# Patient Record
Sex: MALE | Race: BLACK OR AFRICAN AMERICAN | NOT HISPANIC OR LATINO | URBAN - METROPOLITAN AREA
[De-identification: names, ages, dates, MRNs, and addresses within clinical notes are randomized per-mention and may not be internally consistent; named-entity substitution may affect disease eponyms.]

---

## 2017-01-23 ENCOUNTER — OUTPATIENT (OUTPATIENT)
Dept: OUTPATIENT SERVICES | Facility: HOSPITAL | Age: 52
LOS: 1 days | End: 2017-01-23
Payer: COMMERCIAL

## 2017-01-23 VITALS
TEMPERATURE: 97 F | SYSTOLIC BLOOD PRESSURE: 131 MMHG | RESPIRATION RATE: 18 BRPM | HEART RATE: 58 BPM | HEIGHT: 74 IN | DIASTOLIC BLOOD PRESSURE: 83 MMHG | OXYGEN SATURATION: 99 % | WEIGHT: 210.1 LBS

## 2017-01-23 VITALS
HEART RATE: 61 BPM | DIASTOLIC BLOOD PRESSURE: 83 MMHG | SYSTOLIC BLOOD PRESSURE: 131 MMHG | OXYGEN SATURATION: 99 % | WEIGHT: 210.1 LBS | HEIGHT: 74 IN | TEMPERATURE: 97 F | RESPIRATION RATE: 18 BRPM

## 2017-01-23 DIAGNOSIS — Z01.810 ENCOUNTER FOR PREPROCEDURAL CARDIOVASCULAR EXAMINATION: ICD-10-CM

## 2017-01-23 LAB
ANION GAP SERPL CALC-SCNC: 10 MMOL/L — SIGNIFICANT CHANGE UP (ref 5–17)
APTT BLD: 35.4 SEC — SIGNIFICANT CHANGE UP (ref 27.5–37.4)
BASOPHILS # BLD AUTO: 0 K/UL — SIGNIFICANT CHANGE UP (ref 0–0.2)
BASOPHILS NFR BLD AUTO: 0.2 % — SIGNIFICANT CHANGE UP (ref 0–2)
BUN SERPL-MCNC: 19 MG/DL — SIGNIFICANT CHANGE UP (ref 8–20)
CALCIUM SERPL-MCNC: 9 MG/DL — SIGNIFICANT CHANGE UP (ref 8.6–10.2)
CHLORIDE SERPL-SCNC: 103 MMOL/L — SIGNIFICANT CHANGE UP (ref 98–107)
CHOLEST SERPL-MCNC: 139 MG/DL — SIGNIFICANT CHANGE UP (ref 110–199)
CO2 SERPL-SCNC: 27 MMOL/L — SIGNIFICANT CHANGE UP (ref 22–29)
CREAT SERPL-MCNC: 1.26 MG/DL — SIGNIFICANT CHANGE UP (ref 0.5–1.3)
EOSINOPHIL # BLD AUTO: 0 K/UL — SIGNIFICANT CHANGE UP (ref 0–0.5)
EOSINOPHIL NFR BLD AUTO: 0.9 % — SIGNIFICANT CHANGE UP (ref 0–5)
GLUCOSE SERPL-MCNC: 98 MG/DL — SIGNIFICANT CHANGE UP (ref 70–115)
HCT VFR BLD CALC: 41.2 % — LOW (ref 42–52)
HDLC SERPL-MCNC: 71 MG/DL — SIGNIFICANT CHANGE UP
HGB BLD-MCNC: 14 G/DL — SIGNIFICANT CHANGE UP (ref 14–18)
INR BLD: 1.23 RATIO — HIGH (ref 0.88–1.16)
LIPID PNL WITH DIRECT LDL SERPL: 54 MG/DL — SIGNIFICANT CHANGE UP
LYMPHOCYTES # BLD AUTO: 1.2 K/UL — SIGNIFICANT CHANGE UP (ref 1–4.8)
LYMPHOCYTES # BLD AUTO: 26.2 % — SIGNIFICANT CHANGE UP (ref 20–55)
MCHC RBC-ENTMCNC: 34 G/DL — SIGNIFICANT CHANGE UP (ref 32–36)
MCHC RBC-ENTMCNC: 34.1 PG — HIGH (ref 27–31)
MCV RBC AUTO: 100.5 FL — HIGH (ref 80–94)
MONOCYTES # BLD AUTO: 0.4 K/UL — SIGNIFICANT CHANGE UP (ref 0–0.8)
MONOCYTES NFR BLD AUTO: 9.3 % — SIGNIFICANT CHANGE UP (ref 3–10)
NEUTROPHILS # BLD AUTO: 2.9 K/UL — SIGNIFICANT CHANGE UP (ref 1.8–8)
NEUTROPHILS NFR BLD AUTO: 63.2 % — SIGNIFICANT CHANGE UP (ref 37–73)
PLATELET # BLD AUTO: 267 K/UL — SIGNIFICANT CHANGE UP (ref 150–400)
POTASSIUM SERPL-MCNC: 4.1 MMOL/L — SIGNIFICANT CHANGE UP (ref 3.5–5.3)
POTASSIUM SERPL-SCNC: 4.1 MMOL/L — SIGNIFICANT CHANGE UP (ref 3.5–5.3)
PROTHROM AB SERPL-ACNC: 13.6 SEC — HIGH (ref 10–13.1)
RBC # BLD: 4.1 M/UL — LOW (ref 4.6–6.2)
RBC # FLD: 12.7 % — SIGNIFICANT CHANGE UP (ref 11–15.6)
SODIUM SERPL-SCNC: 140 MMOL/L — SIGNIFICANT CHANGE UP (ref 135–145)
TOTAL CHOLESTEROL/HDL RATIO MEASUREMENT: 2 RATIO — LOW (ref 3.4–9.6)
TRIGL SERPL-MCNC: 71 MG/DL — SIGNIFICANT CHANGE UP (ref 10–200)
WBC # BLD: 4.61 K/UL — LOW (ref 4.8–10.8)
WBC # FLD AUTO: 4.61 K/UL — LOW (ref 4.8–10.8)

## 2017-01-23 PROCEDURE — 80048 BASIC METABOLIC PNL TOTAL CA: CPT

## 2017-01-23 PROCEDURE — G0463: CPT

## 2017-01-23 PROCEDURE — 93005 ELECTROCARDIOGRAM TRACING: CPT

## 2017-01-23 PROCEDURE — 85610 PROTHROMBIN TIME: CPT

## 2017-01-23 PROCEDURE — 85027 COMPLETE CBC AUTOMATED: CPT

## 2017-01-23 PROCEDURE — 85730 THROMBOPLASTIN TIME PARTIAL: CPT

## 2017-01-23 PROCEDURE — 93010 ELECTROCARDIOGRAM REPORT: CPT

## 2017-01-23 PROCEDURE — 80061 LIPID PANEL: CPT

## 2017-01-23 NOTE — ASU PATIENT PROFILE, ADULT - PMH
Cardiomegaly    HTN (hypertension) Cardiomegaly    ETOH abuse  reports  he  is  not  an  alcoholic,  does  dring  six  pk  beer  daily  HTN (hypertension)    Smoker  .5  ppd,  cigarette,  THC   3  x   weekly

## 2017-01-23 NOTE — H&P CARDIOLOGY - FAMILY HISTORY
Mother  Still living? Yes, Estimated age: 61-70  Family history of cardiomyopathy, Age at diagnosis: Age Unknown     Child  Still living? Yes, Estimated age: 11-20  Family history of pacemaker, Age at diagnosis: Age Unknown

## 2017-01-23 NOTE — H&P CARDIOLOGY - HISTORY OF PRESENT ILLNESS
52 yo male with HTN no longer on medications, smoker, heavy drinker and occasional maijuana use, and cardiomegaly with a family history of cardiomyopathy. He had shoulder surgery and prior to surgery had a cardiac work-up for abnormal EKG. NST demonstarted small area basal to mid inferoposterior wall with moderately reduced EF of 38%. Echo suggests LVEF 45-50%, mild LVH and mildly dilated aortic root. He now presents for PST prior to [facility].    Cardiologist: Dr. Cornejo  PMD: Dr. Latham

## 2017-01-24 ENCOUNTER — TRANSCRIPTION ENCOUNTER (OUTPATIENT)
Age: 52
End: 2017-01-24

## 2017-01-24 ENCOUNTER — OUTPATIENT (OUTPATIENT)
Dept: OUTPATIENT SERVICES | Facility: HOSPITAL | Age: 52
LOS: 1 days | Discharge: HOME | End: 2017-01-24
Payer: COMMERCIAL

## 2017-01-24 VITALS
TEMPERATURE: 98 F | SYSTOLIC BLOOD PRESSURE: 126 MMHG | OXYGEN SATURATION: 98 % | HEART RATE: 60 BPM | DIASTOLIC BLOOD PRESSURE: 67 MMHG | RESPIRATION RATE: 16 BRPM

## 2017-01-24 VITALS
DIASTOLIC BLOOD PRESSURE: 90 MMHG | RESPIRATION RATE: 17 BRPM | OXYGEN SATURATION: 98 % | SYSTOLIC BLOOD PRESSURE: 140 MMHG | HEART RATE: 59 BPM

## 2017-01-24 DIAGNOSIS — I25.10 ATHEROSCLEROTIC HEART DISEASE OF NATIVE CORONARY ARTERY WITHOUT ANGINA PECTORIS: ICD-10-CM

## 2017-01-24 DIAGNOSIS — Z01.810 ENCOUNTER FOR PREPROCEDURAL CARDIOVASCULAR EXAMINATION: ICD-10-CM

## 2017-01-24 DIAGNOSIS — I51.7 CARDIOMEGALY: ICD-10-CM

## 2017-01-24 PROCEDURE — 93460 R&L HRT ART/VENTRICLE ANGIO: CPT | Mod: 26

## 2017-01-24 RX ORDER — LISINOPRIL 2.5 MG/1
1 TABLET ORAL
Qty: 0 | Refills: 0 | COMMUNITY

## 2017-01-24 RX ORDER — CARVEDILOL PHOSPHATE 80 MG/1
1 CAPSULE, EXTENDED RELEASE ORAL
Qty: 0 | Refills: 0 | COMMUNITY

## 2017-01-24 RX ORDER — SODIUM CHLORIDE 9 MG/ML
1000 INJECTION INTRAMUSCULAR; INTRAVENOUS; SUBCUTANEOUS
Qty: 0 | Refills: 0 | Status: DISCONTINUED | OUTPATIENT
Start: 2017-01-24 | End: 2017-02-08

## 2017-01-24 RX ORDER — ASPIRIN/CALCIUM CARB/MAGNESIUM 324 MG
325 TABLET ORAL ONCE
Qty: 0 | Refills: 0 | Status: COMPLETED | OUTPATIENT
Start: 2017-01-24 | End: 2017-01-24

## 2017-01-24 RX ADMIN — Medication 325 MILLIGRAM(S): at 10:36

## 2017-01-24 NOTE — DISCHARGE NOTE ADULT - MEDICATION SUMMARY - MEDICATIONS TO TAKE
I will START or STAY ON the medications listed below when I get home from the hospital:    lisinopril 10 mg oral tablet  -- 1 tab(s) by mouth once a day  -- Indication: For bp    carvedilol 3.125 mg oral tablet  -- 1 tab(s) by mouth 2 times a day  -- Indication: For bp

## 2017-01-24 NOTE — DISCHARGE NOTE ADULT - CARE PROVIDER_API CALL
Keron Olivares ([facility]), Cardiology; Cardiovascular Disease; Interventional Cardiology  [address] [place] [id number]  Phone: [phone]  Fax: [phone]

## 2017-01-24 NOTE — DISCHARGE NOTE ADULT - CARE PLAN
Principal Discharge DX:	Cardiomegaly  Goal:	optimize cardiac health  Instructions for follow-up, activity and diet:	Restricted use with no heavy lifting of affected arm for 48 hours.  No submerging the arm in water for 48 hours.  You may start showering today.  Call your doctor for any bleeding, swelling, loss of sensation in the hand or fingers, or fingers turning blue.  If heavy bleeding or large lumps form, hold pressure at the spot and come to the Emergency Room.

## 2017-01-24 NOTE — DISCHARGE NOTE ADULT - PLAN OF CARE
optimize cardiac health Restricted use with no heavy lifting of affected arm for 48 hours.  No submerging the arm in water for 48 hours.  You may start showering today.  Call your doctor for any bleeding, swelling, loss of sensation in the hand or fingers, or fingers turning blue.  If heavy bleeding or large lumps form, hold pressure at the spot and come to the Emergency Room.

## 2017-01-24 NOTE — DISCHARGE NOTE ADULT - CARE PROVIDERS DIRECT ADDRESSES
,DirectAddress_Unknown,[email or web address]

## 2017-01-24 NOTE — DISCHARGE NOTE ADULT - PATIENT PORTAL LINK FT
“You can access the [facility] Patient Portal, offered by [facility], by registering with the following website: [email or web address]”

## 2017-04-13 PROCEDURE — C1769: CPT

## 2017-04-13 PROCEDURE — C1760: CPT

## 2017-04-13 PROCEDURE — C1894: CPT

## 2017-04-13 PROCEDURE — 93460 R&L HRT ART/VENTRICLE ANGIO: CPT

## 2017-04-13 PROCEDURE — C1889: CPT

## 2017-04-13 PROCEDURE — C1887: CPT

## 2018-01-26 ENCOUNTER — RX RENEWAL (OUTPATIENT)
Age: 53
End: 2018-01-26

## 2018-06-04 ENCOUNTER — RX RENEWAL (OUTPATIENT)
Age: 53
End: 2018-06-04

## 2018-07-30 ENCOUNTER — RX RENEWAL (OUTPATIENT)
Age: 53
End: 2018-07-30

## 2018-07-30 RX ORDER — LISINOPRIL 10 MG/1
10 TABLET ORAL
Qty: 90 | Refills: 1 | Status: ACTIVE | COMMUNITY
Start: 2018-01-26 | End: 1900-01-01

## 2018-08-29 PROBLEM — I51.7 CARDIOMEGALY: Chronic | Status: ACTIVE | Noted: 2017-01-23

## 2018-08-29 PROBLEM — F10.10 ALCOHOL ABUSE, UNCOMPLICATED: Chronic | Status: ACTIVE | Noted: 2017-01-23

## 2018-08-29 PROBLEM — F17.200 NICOTINE DEPENDENCE, UNSPECIFIED, UNCOMPLICATED: Chronic | Status: ACTIVE | Noted: 2017-01-23

## 2018-08-29 PROBLEM — I10 ESSENTIAL (PRIMARY) HYPERTENSION: Chronic | Status: ACTIVE | Noted: 2017-01-23

## 2018-09-10 ENCOUNTER — RX RENEWAL (OUTPATIENT)
Age: 53
End: 2018-09-10

## 2018-09-10 RX ORDER — CARVEDILOL 6.25 MG/1
6.25 TABLET, FILM COATED ORAL
Qty: 180 | Refills: 0 | Status: ACTIVE | COMMUNITY
Start: 2018-06-04 | End: 1900-01-01

## 2018-09-11 ENCOUNTER — RECORD ABSTRACTING (OUTPATIENT)
Age: 53
End: 2018-09-11

## 2018-09-11 DIAGNOSIS — Z82.49 FAMILY HISTORY OF ISCHEMIC HEART DISEASE AND OTHER DISEASES OF THE CIRCULATORY SYSTEM: ICD-10-CM

## 2018-09-17 ENCOUNTER — APPOINTMENT (OUTPATIENT)
Dept: CARDIOLOGY | Facility: CLINIC | Age: 53
End: 2018-09-17
Payer: MEDICAID

## 2018-09-17 VITALS
HEIGHT: 74 IN | SYSTOLIC BLOOD PRESSURE: 122 MMHG | BODY MASS INDEX: 24.9 KG/M2 | HEART RATE: 72 BPM | DIASTOLIC BLOOD PRESSURE: 79 MMHG | WEIGHT: 194 LBS | RESPIRATION RATE: 14 BRPM

## 2018-09-17 DIAGNOSIS — R07.89 OTHER CHEST PAIN: ICD-10-CM

## 2018-09-17 DIAGNOSIS — I51.7 CARDIOMEGALY: ICD-10-CM

## 2018-09-17 DIAGNOSIS — I45.10 UNSPECIFIED RIGHT BUNDLE-BRANCH BLOCK: ICD-10-CM

## 2018-09-17 DIAGNOSIS — I10 ESSENTIAL (PRIMARY) HYPERTENSION: ICD-10-CM

## 2018-09-17 PROCEDURE — 99214 OFFICE O/P EST MOD 30 MIN: CPT

## 2018-09-17 PROCEDURE — 93224 XTRNL ECG REC UP TO 48 HRS: CPT

## 2018-09-17 PROCEDURE — 93000 ELECTROCARDIOGRAM COMPLETE: CPT

## 2018-09-17 RX ORDER — IBUPROFEN 400 MG/1
400 TABLET, FILM COATED ORAL 3 TIMES DAILY
Qty: 30 | Refills: 0 | Status: ACTIVE | COMMUNITY
Start: 2018-09-17 | End: 1900-01-01

## 2018-09-17 RX ORDER — OXYCODONE HYDROCHLORIDE 30 MG/1
TABLET ORAL
Refills: 0 | Status: DISCONTINUED | COMMUNITY
End: 2018-09-17

## 2018-10-17 ENCOUNTER — APPOINTMENT (OUTPATIENT)
Dept: CARDIOLOGY | Facility: CLINIC | Age: 53
End: 2018-10-17
Payer: MEDICAID

## 2018-10-17 PROCEDURE — 93306 TTE W/DOPPLER COMPLETE: CPT

## 2018-10-18 ENCOUNTER — APPOINTMENT (OUTPATIENT)
Dept: CARDIOLOGY | Facility: CLINIC | Age: 53
End: 2018-10-18

## 2019-01-25 ENCOUNTER — APPOINTMENT (OUTPATIENT)
Dept: CARDIOLOGY | Facility: CLINIC | Age: 54
End: 2019-01-25

## 2019-02-10 PROBLEM — I45.10 RIGHT BUNDLE BRANCH BLOCK (RBBB): Status: ACTIVE | Noted: 2018-09-17

## 2020-06-01 ENCOUNTER — OFFICE VISIT (OUTPATIENT)
Dept: URBAN - METROPOLITAN AREA CLINIC 105 | Facility: CLINIC | Age: 55
End: 2020-06-01

## 2020-07-13 ENCOUNTER — OFFICE VISIT (OUTPATIENT)
Dept: URBAN - METROPOLITAN AREA CLINIC 105 | Facility: CLINIC | Age: 55
End: 2020-07-13

## 2020-08-14 ENCOUNTER — DASHBOARD ENCOUNTERS (OUTPATIENT)
Age: 55
End: 2020-08-14

## 2020-08-14 ENCOUNTER — OFFICE VISIT (OUTPATIENT)
Dept: URBAN - METROPOLITAN AREA CLINIC 105 | Facility: CLINIC | Age: 55
End: 2020-08-14
Payer: COMMERCIAL

## 2020-08-14 ENCOUNTER — LAB OUTSIDE AN ENCOUNTER (OUTPATIENT)
Dept: URBAN - METROPOLITAN AREA CLINIC 105 | Facility: CLINIC | Age: 55
End: 2020-08-14

## 2020-08-14 DIAGNOSIS — R63.4 WEIGHT LOSS: ICD-10-CM

## 2020-08-14 DIAGNOSIS — Z12.11 SCREEN FOR COLON CANCER: ICD-10-CM

## 2020-08-14 DIAGNOSIS — I10 ESSENTIAL HYPERTENSION: ICD-10-CM

## 2020-08-14 DIAGNOSIS — R19.4 CHANGE IN BOWEL HABIT: ICD-10-CM

## 2020-08-14 PROBLEM — 59621000: Status: ACTIVE | Noted: 2020-08-14

## 2020-08-14 PROBLEM — 305058001: Status: ACTIVE | Noted: 2020-08-14

## 2020-08-14 PROBLEM — 129851009: Status: ACTIVE | Noted: 2020-08-14

## 2020-08-14 PROBLEM — 89362005: Status: ACTIVE | Noted: 2020-08-14

## 2020-08-14 PROCEDURE — G8427 DOCREV CUR MEDS BY ELIG CLIN: HCPCS | Performed by: INTERNAL MEDICINE

## 2020-08-14 PROCEDURE — G8417 CALC BMI ABV UP PARAM F/U: HCPCS | Performed by: INTERNAL MEDICINE

## 2020-08-14 PROCEDURE — 3017F COLORECTAL CA SCREEN DOC REV: CPT | Performed by: INTERNAL MEDICINE

## 2020-08-14 PROCEDURE — G8753 SYS BP > OR = 140: HCPCS | Performed by: INTERNAL MEDICINE

## 2020-08-14 PROCEDURE — 99203 OFFICE O/P NEW LOW 30 MIN: CPT | Performed by: INTERNAL MEDICINE

## 2020-08-14 PROCEDURE — 1036F TOBACCO NON-USER: CPT | Performed by: INTERNAL MEDICINE

## 2020-08-14 NOTE — HPI-OTHER HISTORIES
56 yo male pt of DR Edwards.  He needs a screening colonoscopy. He has had a colonoscopy over 10 yrs ago. He lost weight about 10-15 lbs over 6 months in 1/2020. He thinks heis gaining it back. He says he used to have a BM once a day, now 3-4 times a day. Consistency goes from hard to loose.  No blood in stool. No fhx of colon cancer.  No fevers or chills.  Says had recent physical with wnl labs.

## 2020-09-24 ENCOUNTER — OFFICE VISIT (OUTPATIENT)
Dept: URBAN - METROPOLITAN AREA MEDICAL CENTER 33 | Facility: MEDICAL CENTER | Age: 55
End: 2020-09-24

## 2021-12-20 ENCOUNTER — OFFICE VISIT (OUTPATIENT)
Dept: URBAN - METROPOLITAN AREA CLINIC 25 | Facility: CLINIC | Age: 56
End: 2021-12-20